# Patient Record
Sex: FEMALE | Race: WHITE | Employment: FULL TIME | URBAN - METROPOLITAN AREA
[De-identification: names, ages, dates, MRNs, and addresses within clinical notes are randomized per-mention and may not be internally consistent; named-entity substitution may affect disease eponyms.]

---

## 2017-05-17 LAB
CHLAMYDIA, EXTERNAL: NEGATIVE
HBSAG, EXTERNAL: NEGATIVE
HIV, EXTERNAL: NONREACTIVE
N. GONORRHEA, EXTERNAL: NEGATIVE
RUBELLA, EXTERNAL: NORMAL
TYPE, ABO & RH, EXTERNAL: NORMAL

## 2017-09-27 LAB
ANTIBODY SCREEN, EXTERNAL: NEGATIVE
T. PALLIDUM, EXTERNAL: NEGATIVE

## 2017-11-22 LAB — GRBS, EXTERNAL: NEGATIVE

## 2017-12-04 ENCOUNTER — HOSPITAL ENCOUNTER (INPATIENT)
Age: 32
LOS: 3 days | Discharge: HOME | End: 2017-12-07
Attending: OBSTETRICS & GYNECOLOGY | Admitting: OBSTETRICS & GYNECOLOGY
Payer: COMMERCIAL

## 2017-12-04 PROBLEM — O13.9 GESTATIONAL HYPERTENSION: Status: ACTIVE | Noted: 2017-12-04

## 2017-12-04 LAB
ALBUMIN SERPL-MCNC: 3 G/DL (ref 3.5–5)
ALBUMIN/GLOB SERPL: 0.7 {RATIO} (ref 1.1–2.2)
ALP SERPL-CCNC: 195 U/L (ref 45–117)
ALT SERPL-CCNC: 20 U/L (ref 12–78)
ANION GAP SERPL CALC-SCNC: 9 MMOL/L (ref 5–15)
AST SERPL-CCNC: 18 U/L (ref 15–37)
BILIRUB SERPL-MCNC: 0.3 MG/DL (ref 0.2–1)
BUN SERPL-MCNC: 8 MG/DL (ref 6–20)
BUN/CREAT SERPL: 12 (ref 12–20)
CALCIUM SERPL-MCNC: 8.5 MG/DL (ref 8.5–10.1)
CHLORIDE SERPL-SCNC: 107 MMOL/L (ref 97–108)
CO2 SERPL-SCNC: 21 MMOL/L (ref 21–32)
CREAT SERPL-MCNC: 0.66 MG/DL (ref 0.55–1.02)
CREAT UR-MCNC: 39.7 MG/DL
ERYTHROCYTE [DISTWIDTH] IN BLOOD BY AUTOMATED COUNT: 13.5 % (ref 11.5–14.5)
GLOBULIN SER CALC-MCNC: 4.4 G/DL (ref 2–4)
GLUCOSE SERPL-MCNC: 73 MG/DL (ref 65–100)
HCT VFR BLD AUTO: 37.6 % (ref 35–47)
HGB BLD-MCNC: 12.7 G/DL (ref 11.5–16)
MCH RBC QN AUTO: 30.2 PG (ref 26–34)
MCHC RBC AUTO-ENTMCNC: 33.8 G/DL (ref 30–36.5)
MCV RBC AUTO: 89.5 FL (ref 80–99)
PLATELET # BLD AUTO: 217 K/UL (ref 150–400)
POTASSIUM SERPL-SCNC: 3.6 MMOL/L (ref 3.5–5.1)
PROT SERPL-MCNC: 7.4 G/DL (ref 6.4–8.2)
PROT UR-MCNC: 7 MG/DL (ref 0–11.9)
PROT/CREAT UR-RTO: 0.2
RBC # BLD AUTO: 4.2 M/UL (ref 3.8–5.2)
SODIUM SERPL-SCNC: 137 MMOL/L (ref 136–145)
URATE SERPL-MCNC: 5.2 MG/DL (ref 2.6–6)
WBC # BLD AUTO: 11.3 K/UL (ref 3.6–11)

## 2017-12-04 PROCEDURE — 65410000002 HC RM PRIVATE OB

## 2017-12-04 PROCEDURE — 77030007880 HC KT SPN EPDRL BBMI -B

## 2017-12-04 PROCEDURE — 75410000002 HC LABOR FEE PER 1 HR

## 2017-12-04 PROCEDURE — 80053 COMPREHEN METABOLIC PANEL: CPT | Performed by: OBSTETRICS & GYNECOLOGY

## 2017-12-04 PROCEDURE — 85027 COMPLETE CBC AUTOMATED: CPT | Performed by: OBSTETRICS & GYNECOLOGY

## 2017-12-04 PROCEDURE — 77030034849

## 2017-12-04 PROCEDURE — 84156 ASSAY OF PROTEIN URINE: CPT | Performed by: OBSTETRICS & GYNECOLOGY

## 2017-12-04 PROCEDURE — 4A0HXCZ MEASUREMENT OF PRODUCTS OF CONCEPTION, CARDIAC RATE, EXTERNAL APPROACH: ICD-10-PCS | Performed by: OBSTETRICS & GYNECOLOGY

## 2017-12-04 PROCEDURE — 59025 FETAL NON-STRESS TEST: CPT

## 2017-12-04 PROCEDURE — 10907ZC DRAINAGE OF AMNIOTIC FLUID, THERAPEUTIC FROM PRODUCTS OF CONCEPTION, VIA NATURAL OR ARTIFICIAL OPENING: ICD-10-PCS | Performed by: OBSTETRICS & GYNECOLOGY

## 2017-12-04 PROCEDURE — 84550 ASSAY OF BLOOD/URIC ACID: CPT | Performed by: OBSTETRICS & GYNECOLOGY

## 2017-12-04 PROCEDURE — 36415 COLL VENOUS BLD VENIPUNCTURE: CPT | Performed by: OBSTETRICS & GYNECOLOGY

## 2017-12-04 RX ORDER — SODIUM CHLORIDE 0.9 % (FLUSH) 0.9 %
5-10 SYRINGE (ML) INJECTION EVERY 8 HOURS
Status: DISCONTINUED | OUTPATIENT
Start: 2017-12-04 | End: 2017-12-05

## 2017-12-04 RX ORDER — SODIUM CHLORIDE, SODIUM LACTATE, POTASSIUM CHLORIDE, CALCIUM CHLORIDE 600; 310; 30; 20 MG/100ML; MG/100ML; MG/100ML; MG/100ML
125 INJECTION, SOLUTION INTRAVENOUS CONTINUOUS
Status: DISCONTINUED | OUTPATIENT
Start: 2017-12-04 | End: 2017-12-04

## 2017-12-04 RX ORDER — SODIUM CHLORIDE 0.9 % (FLUSH) 0.9 %
5-10 SYRINGE (ML) INJECTION AS NEEDED
Status: DISCONTINUED | OUTPATIENT
Start: 2017-12-04 | End: 2017-12-05

## 2017-12-04 RX ORDER — OXYTOCIN IN 5 % DEXTROSE 30/500 ML
1-25 PLASTIC BAG, INJECTION (ML) INTRAVENOUS
Status: DISCONTINUED | OUTPATIENT
Start: 2017-12-05 | End: 2017-12-05

## 2017-12-04 RX ORDER — SODIUM CHLORIDE, SODIUM LACTATE, POTASSIUM CHLORIDE, CALCIUM CHLORIDE 600; 310; 30; 20 MG/100ML; MG/100ML; MG/100ML; MG/100ML
125 INJECTION, SOLUTION INTRAVENOUS CONTINUOUS
Status: DISCONTINUED | OUTPATIENT
Start: 2017-12-05 | End: 2017-12-05

## 2017-12-04 RX ORDER — NALOXONE HYDROCHLORIDE 0.4 MG/ML
0.4 INJECTION, SOLUTION INTRAMUSCULAR; INTRAVENOUS; SUBCUTANEOUS AS NEEDED
Status: DISCONTINUED | OUTPATIENT
Start: 2017-12-04 | End: 2017-12-05

## 2017-12-04 NOTE — IP AVS SNAPSHOT
Summary of Care Report The Summary of Care report has been created to help improve care coordination. Users with access to [facility] or [address] (Web-based application) may access additional patient information including the Discharge Summary. If you are not currently a [address] user and need more information, please call the number listed below in the [place] [address] section and ask to be connected with Medical Records. Facility Information Name Address Phone [address] [address] [phone] Patient Information Patient Name Sex  Ne Miner ([id number]) Female 1985 Discharge Information Admitting Provider Service Area Unit Agustín Mcarthur MD / [address] [facility] [address]  Delivery / [phone] Discharge Provider Discharge Date/Time Discharge Disposition Destination (none) 2017 (Pending) AHR (none) Patient Language Language ENGLISH [13] Hospital Problems as of 2017  Reviewed: 2016  1:59 PM by Gisella Vázquez None Non-Hospital Problems as of 2017  Reviewed: 2016  1:59 PM by Gisella Vázquez Class Noted - Resolved Last Modified Active Problems Anxiety state, unspecified  3/2/2015 - Present 3/2/2015 by Rajani Contreras MD  
  Entered by Rajani Contreras MD  
  
You are allergic to the following Allergen Reactions Pcn (Penicillins) Hives Swelling Tramadol Nausea Only Vertigo Current Discharge Medication List  
  
START taking these medications Dose & Instructions Dispensing Information Comments  
 ibuprofen 600 mg tablet Commonly known as:  MOTRIN Dose:  600 mg Take 1 Tab by mouth every six (6) hours as needed for Pain for up to 360 days. Quantity:  30 Tab Refills:  0 CONTINUE these medications which have NOT CHANGED Dose & Instructions Dispensing Information Comments  
 prenatal multivit-ca-min-fe-fa Tab Take  by mouth. Refills:  0 Current Immunizations Name Date MMR 12/7/2017 Surgery Information ID Date/Time Status Primary Surgeon All Procedures Location [id number] 12/5/2017 Complete   ZZANESTHESIA [facility] - DO NOT SCHEDULE Follow-up Information Follow up With Details Comments Contact Info Henri Parsons NP   [address] [address] [phone] Discharge Instructions POST DELIVERY DISCHARGE INSTRUCTIONS Name: Radha Lr YOB: 1985 Primary Diagnosis: Active Problems: * No active hospital problems. * General:  
 
Diet/Diet Restrictions: 
· Eight 8-ounce glasses of fluid daily (water, juices); avoid excessive caffeine intake. · Meals/snacks as desired which are high in fiber and carbohydrates and low in fat and cholesterol. Medications:  
 
 
 
Physical Activity / Restrictions / Safety: · Avoid heavy lifting, no more that 8 lbs. For 2-3 weeks;  
· Limit use of stairs to 2 times daily for the first week home. · No driving for one week. · Avoid intercourse 4-6 weeks, no douching or tampon use. · Check with obstetrician before starting or resuming an exercise program.   
 
Discharge Instructions/Special Treatment/Home Care Needs:  
 
· Continue prenatal vitamins. · Continue to use squirt bottle with warm water on your episiotomy after each bathroom use until bleeding stops. · If steri-strips applied to your incision, remove in 7-10 days. Call your doctor for the following: · Fever over 101 degrees by mouth. · Vaginal bleeding heavier than a normal menstrual period or clots larger than a golf ball.  
· Red streaks or increased swelling of legs, painful red streaks on your breast. 
 · Painful urination, constipation and increased pain or swelling or discharge with your incision. · If you feel extremely anxious or overwhelmed. · If you have thoughts of harming yourself and/or your baby. Pain Management:  
 
· Take Acetaminophen (Tylenol) or Ibuprofen (Advil, Motrin), as directed for pain. · Use a warm Sitz bath 3 times daily to relieve episiotomy or hemorrhoidal discomfort. · For hemorrhoidal discomfort, use Tucks and Anusol cream as needed and directed. · Heating pad to  incision as needed. Follow-Up Care:  
 
Appointment with MD: Follow-up Appointments Procedures  FOLLOW UP VISIT Appointment in: One Week For blood pressure check with Dr Peggy Langley and 6 weeks for postpartum check For blood pressure check with Dr Peggy Langley and 6 weeks for postpartum check Standing Status:   Standing Number of Occurrences:   1 Order Specific Question:   Appointment in Answer: One Week Telephone number: [phone] Signed By: Tara Call MD                                                                                                   Date: 2017 Time: 10:26 AM 
 
Gamelet Activation Thank you for requesting access to Gamelet. Please follow the instructions below to securely access and download your online medical record. Gamelet allows you to send messages to your doctor, view your test results, renew your prescriptions, schedule appointments, and more. How Do I Sign Up? 1. In your internet browser, go to [email or web address]. [employer]. 2. Click on the First Time User? Click Here link in the Sign In box. You will see the New Member Sign Up page. 3. Enter your Gamelet Access Code exactly as it appears below. You will not need to use this code after youve completed the sign-up process. If you do not sign up before the expiration date, you must request a new code. Gamelet Access Code: [email or web address] Expires: 3/7/2018 11:07 AM (This is the date your Jiahe access code will ) 4. Enter the last four digits of your Social Security Number (xxxx) and Date of Birth (mm/dd/yyyy) as indicated and click Submit. You will be taken to the next sign-up page. 5. Create a Pyroliat ID. This will be your Jiahe login ID and cannot be changed, so think of one that is secure and easy to remember. 6. Create a Jiahe password. You can change your password at any time. 7. Enter your Password Reset Question and Answer. This can be used at a later time if you forget your password. 8. Enter your e-mail address. You will receive e-mail notification when new information is available in [address] 9. Click Sign Up. You can now view and download portions of your medical record. 10. Click the Download Summary menu link to download a portable copy of your medical information. Additional Information If you have questions, please visit the Frequently Asked Questions section of the Jiahe website at [email or web address] Remember, Jiahe is NOT to be used for urgent needs. For medical emergencies, dial 911. Chart Review Routing History Recipient Method Report Sent By Eric Alfaro NP Fax: [phone] Phone: [phone] Fax Efe Gutierrez MD NOTES AUTO ROUTING REPORT Arpan Elizondo MD [[id number]] 2017  1:54 PM 2017 Neena Alfaro NP Fax: [phone] Phone: [phone] Fax Efe Gutierrez MD NOTES AUTO ROUTING REPORT Marcio Hillman MD [[id number]] 2017 10:27 AM 2017

## 2017-12-04 NOTE — H&P
EDC:2017  EGA: 37 weeks, 6 days      Vital Signs   36Years Old Female  Height:  65.5 inches  Weight: 198 pounds  BMI:      32.57  BP:       144/98  [facility]    Pap/HPV/Gardasil History   History of abnormal pap: no  Gardasil Injection History: Declined    Patient's Prenatal Care with Doctor of Record Rachel Girard MD Notable For -    Gestational hypertension  Rubella non immune vaccinate PP   lab screening  Normal pregnancy primigravida                  Allergies    PENICILLIN (Moderate)  TRAMADOL HCL (TRAMADOL HCL TABS) (Moderate)      Medications Removed from Medication List        Flowsheet View for Follow-up Visit     Estimated weeks of        gestation:  37 6/7     Weight:  198     Blood pressure: 144 / 98     Urine Protein:  N     Urine Glucose: N     Headache:  No     Nausea/vomiting: No     Edema:  2+LE     Vaginal bleeding: no     Vaginal discharge: no     Fetal activity:  yes     Fundal height:    37     FHR:   140     Labor symptoms: few ctx     Fetal position:  cephalic     Cx Dilation:  1     Cx Effacement: 60%     Cx Station:  high     Next visit:  1 wk     Preceptor:  Tristan Davis:  denies exposure     Comment:  javy ok . home bps to 150 yesterday . repeat blood pressure 140s/ high 90s   to L&D for further evaluation           Impression & Recommendations:    Problem # 1:  Gestational hypertension (IAZ-930.34) (OEW43-B48.3)  to L&D for further evaluation of accelerated GHTN v preEclampsia  The patient is counseled and her questions are answered. Orders:  Fetal non-stress test ([id number])  76815-Limited ([id number])  As Scheduled (xxxx)          Other Orders:  Antepartum Care (CPT-10)      Medications (at conclusion of this visit)    2017 PRENATAL VITAMINS 28-0.8 MG ORAL TABLET (PRENATAL VIT-FE FUMARATE-FA) Prescribed by non 606/706 Julianna Moreira MD          Primary Provider:  Rachel Girard MD    CC:  Elevated Blood Pressure.     History of Present Illness: The patient presents today for obstetric evaluation. The patient complains of elevated blood pressure to 144/98 in office today , but denies abdominal pain, visual changes or labor. Barclay Muhammad  contractions are mild and irregular. The status of membranes is intact. Vaginal bleeding is not present. Fetal movement is present. She has been monitored [facility] outpatient GHTN for the last couple of weeks. Last growth Ultrasound 11/17    51th  %ile 6 #        Past Medical History:     Reviewed history from 05/17/2017 and no changes required:        mirena 11/15  dc'd 9/16    Past Surgical History:     Reviewed history from 05/17/2017 and no changes required:        Negative    Family History Summary:      Reviewed history Last on 11/22/2017 and no changes required:12/04/2017  MGM - Has Family History of Lung Cancer - Entered On: 11/20/2015  Father Mimi Payment.) - Has Family History of Diabetes - Entered On: 11/20/2015  Father Mimi Payment.) - Has Family History of Heart Disease - Entered On: 11/20/2015  Father Mimi Payment.) - Has Family History of Stroke/CVA - Entered On: 11/20/2015      Social History:     Reviewed history from 05/17/2017 and no changes required:        -  [place] (2017) VSP.   his sister is Brittany Money        works for [employer] as a       Risk Factors:     Smoked Tobacco Use:  Never smoker  Alcohol use:  no  Exercise:  no        Vital Signs    Blood Pressure: 144 / 98    Height:   65.5 inches  Weight:  198 pounds    BMI:   32.57 inches    Physical Exam     General           General appearance:  no acute distress    Head           Inspection:   normal    Eyes           External:   EOM intact    ENT           Dental:   adequate dentition    Chest           Lungs:  clear to auscultation          Heart:  regular rate and rhythm    Extremeties           Extremeties:  2+ edema bilaterally    Neurological           Reflexes:  bilateral hyperreflexia, no clonus    Psych           Orientation:  oriented to time, place, and person          Mood:  no appearance of anxiety, depression, or agitation    Lymph           Inguinal:  no inguinal adenopathy    Skin           Inspection:  no rashes, suspicious lesions, or ulcerations    Abdomen           Abdomen:  gravid          Fundal Height:  37          EFW:  6.5#    Pelvic Exam           EGBUS:  no lesions          Vagina:  normal appearing without lesions or discharge          Uterus:  gravid          Cervix:  no lesions or discharge                  Dilation: : 1                  Effacement:  60%                  Station:  high                  Presentation:  cephalic                  Membranes:  intact                  Pelvis:  adequate                  Consistency:  soft                  Position: anterior  [place] Score:  7    Allergies    PENICILLIN (Moderate)  TRAMADOL HCL (TRAMADOL HCL TABS) (Moderate)      Medications Removed from Medication List        Impression & Recommendations:    Problem # 1:  Gestational hypertension (BLT-109.52) (VOG99-Y54.3)  to L&D for further evaluation of accelerated GHTN v preEclampsia  The patient is counseled and her questions are answered. Orders:  Fetal non-stress test ([id number])  76815-Limited ([id number])  As Scheduled (xxxx)          Other Orders:  Antepartum Care (CPT-10)      Medications (at conclusion of this visit)    05/17/2017 PRENATAL VITAMINS 28-0.8 MG ORAL TABLET (PRENATAL VIT-FE FUMARATE-FA) Prescribed by non 016/234 Julianna Moreira MD          LABORATORY DATA   TEST DATE RESULT   Group B Strep culture 11/22/2017 Negative                                   (Group B Strep Culture Result Field)   Blood Type 05/17/2017 O                                             (Blood Type Result Field)   Rh 05/17/2017 Positive                                   (Rh Result Field)   Rhogam Inj Given     Tdap Vaccine Given 09/27/2017 Vacc.  [address]   Antibody Screen 09/27/2017 Negative   Rubella  [facility] Reference Ranges On or After 3/10/14                  <0.90              Non-immune      0.90 - 0.99     Equivocal      >0.99              Immune    [facility] Reference Ranges  Before 3/10/14           <5                 Non-immune             5 - 9               Equivocal            >9                 Immune  [facility] Reference Ranges       < Or = 0.90       Negative             0.91-1.09          Equivocal            > Or = 1.10       Positive   05/17/2017     <0.90     TPA (T Pallidum Antibodies) 09/27/2017 Negative   Serology (RPR)     HBsAg 05/17/2017 Negative   HIV 05/17/2017 Non Reactive   Hemoglobin 11/27/2017 12.5   Hematocrit 11/27/2017 38.5   Platelets 55/23/5264 189 X10E3/UL   TSH     Urine Culture 08/21/2017 Negative   GC DNA Probe 05/17/2017 Negative   Chlamydia DNA 05/17/2017 Negative   PAP 11/20/2015 NIL   Flu Vaccine Given 09/27/2017 Vacc. VWC   HGBA1C     HGB Electro     T4, Free     BG Fasting     GTT 1H 50G 09/27/2017 114   GTT 1H 100G     GTT 2H 100G     GTT 3H 100G     Glucose Plasma     CF Accept or Decline     CF Screen Result 05/17/2017 Negative   Nuchal Trans 06/14/2017 1.6^1. 6 mm&millimeters   AFP Only 07/05/2017 *Screen Negative*   Tetra     AFP Serum     CVS 09/27/2017 accepted   AFP Amniotic     Amnio Karyo 05/17/2017 declined   FISH     GC Culture     Chlamydia Cult     Ureaplasma     Mycoplasma     WBC 11/27/2017 9.0 X10E3/UL   RBC 11/27/2017 4.10 X10E6/UL   MCV 11/27/2017 94   MCH 11/27/2017 30.5   MCHC RBC 11/27/2017 32.5     ULTRASOUND DATA   TEST DATE RESULT   Estimated Fetal Weight 11/27/2017 0521.16201075^8486 g&grams                                     Weight % 11/27/2017 52^52% %&percent                                                JOSSELYN 11/27/2017 00.41^23.5 cm&centimeters                    BPP     Cervical Length (mm)       ]      Electronically signed by Marsha Ojeda MD on 12/04/2017 at 10:09 AM    ________________________________________________________________________    Date of Surgery Update:  Americo White was seen and examined. S: No CTX, VB, LOF.  + FM. No HA, vision changes, RUQ pain. O:   Patient Vitals for the past 12 hrs:   Temp Pulse Resp BP SpO2   12/04/17 1326 - 93 - 135/88 93 %   12/04/17 1258 98 °F (36.7 °C) 85 18 (!) 141/94 95 %     Gen: NAD  Chest: normal wob  Abd: Gravid, NT/ND. EFW 6.5-7#. : last exam today by Dr. Louie Lesser 1/60. Will repeat during ripening. LE: no CT, 2+ edema    Labs pending including CBC, CMP, uric acid, and UP:C    A/P: 36 y.o. G1 at 37w6d with gHTN vs. Preeclampsia. Proceed with IOL.  -Discussed process of ripening, followed by pitocin/AROM. -Cat 1 tracing  -GBS negative  -Continue to monitor BPs. F/u labs. If si/sx of severe disease develop, will start magnesium.        Signed By: Felipa Pearce MD     December 4, 2017 1:47 PM

## 2017-12-04 NOTE — ROUTINE PROCESS
Pt in from office in [place]. Gr 1 Para 0 EDC 12/19/17 Allergy to PCN and Tramadol HCL. In to evaluate bp and labs. 1450 Pt moved to room 3163 for induction. Pt was given time to call work and make arrangements. Dr Yuni Garcia notified. [facility]  4410 Dr Yuni Garcia in [place] bulb placed with 60cc normal saline inserted in cervix 2 cms dilated. POC pt may eat in 1 hour and may be off the monitor. No IV at this time. 1639 Off monitor per Dr Yuni Garcia.    2017 Report to [facility]

## 2017-12-04 NOTE — PROGRESS NOTES
SVE 2/50/-2. FB placed. Plan--monitor 30 mins, if reactive can come off monitoring and can eat. Overnight, FHTs/vitals every 4 hours. No IVFs if adequate po intake. In am, start IVFs and pitocin. NPO with ice chips. Continuous monitoring.

## 2017-12-04 NOTE — IP AVS SNAPSHOT
[address] [facility] 
[phone] Patient: Isabel Qureshi MRN: [id number] :1985 About your hospitalization You were admitted on:  2017 You last received care in the:  [facility] 3 LABOR & DELIVERY You were discharged on:  2017 Why you were hospitalized Your primary diagnosis was:  Not on File Your diagnoses also included:  Gestational Hypertension Things You Need To Do (next 8 weeks) Follow up with Maury Engel NP Phone:  [phone] Where:  [address], [address] [facility] Discharge Orders None A check ilia indicates which time of day the medication should be taken. My Medications TAKE these medications as instructed Instructions Each Dose to Equal  
 Morning Noon Evening Bedtime  
 ibuprofen 600 mg tablet Commonly known as:  MOTRIN Your last dose was: Your next dose is: Take 1 Tab by mouth every six (6) hours as needed for Pain for up to 360 days. 600 mg  
    
   
   
   
  
 prenatal multivit-ca-min-fe-fa Tab Your last dose was: Your next dose is: Take  by mouth. Where to Get Your Medications Information on where to get these meds will be given to you by the nurse or doctor. ! Ask your nurse or doctor about these medications  
  ibuprofen 600 mg tablet Discharge Instructions POST DELIVERY DISCHARGE INSTRUCTIONS Name: Isabel Qureshi YOB: 1985 Primary Diagnosis: Active Problems: * No active hospital problems. * General:  
 
Diet/Diet Restrictions: 
· Eight 8-ounce glasses of fluid daily (water, juices); avoid excessive caffeine intake. · Meals/snacks as desired which are high in fiber and carbohydrates and low in fat and cholesterol. Medications: Physical Activity / Restrictions / Safety: · Avoid heavy lifting, no more that 8 lbs. For 2-3 weeks;  
· Limit use of stairs to 2 times daily for the first week home. · No driving for one week. · Avoid intercourse 4-6 weeks, no douching or tampon use. · Check with obstetrician before starting or resuming an exercise program.   
 
Discharge Instructions/Special Treatment/Home Care Needs:  
 
· Continue prenatal vitamins. · Continue to use squirt bottle with warm water on your episiotomy after each bathroom use until bleeding stops. · If steri-strips applied to your incision, remove in 7-10 days. Call your doctor for the following: · Fever over 101 degrees by mouth. · Vaginal bleeding heavier than a normal menstrual period or clots larger than a golf ball. · Red streaks or increased swelling of legs, painful red streaks on your breast. 
· Painful urination, constipation and increased pain or swelling or discharge with your incision. · If you feel extremely anxious or overwhelmed. · If you have thoughts of harming yourself and/or your baby. Pain Management:  
 
· Take Acetaminophen (Tylenol) or Ibuprofen (Advil, Motrin), as directed for pain. · Use a warm Sitz bath 3 times daily to relieve episiotomy or hemorrhoidal discomfort. · For hemorrhoidal discomfort, use Tucks and Anusol cream as needed and directed. · Heating pad to  incision as needed. Follow-Up Care:  
 
Appointment with MD: Follow-up Appointments Procedures  FOLLOW UP VISIT Appointment in: One Week For blood pressure check with Dr Roderick Doan and 6 weeks for postpartum check For blood pressure check with Dr Roderick Doan and 6 weeks for postpartum check Standing Status:   Standing Number of Occurrences:   1 Order Specific Question:   Appointment in Answer: One Week Telephone number: [phone] Signed By: Sandra Fulton MD Date: 2017 Time: 10:26 AM 
 
MyChart Activation Thank you for requesting access to rSmart. Please follow the instructions below to securely access and download your online medical record. rSmart allows you to send messages to your doctor, view your test results, renew your prescriptions, schedule appointments, and more. How Do I Sign Up? 1. In your internet browser, go to [email or web address]. 2. Click on the First Time User? Click Here link in the Sign In box. You will see the New Member Sign Up page. 3. Enter your rSmart Access Code exactly as it appears below. You will not need to use this code after youve completed the sign-up process. If you do not sign up before the expiration date, you must request a new code. rSmart Access Code: [email or web address] Expires: 3/7/2018 11:07 AM (This is the date your rSmart access code will ) 4. Enter the last four digits of your Social Security Number (xxxx) and Date of Birth (mm/dd/yyyy) as indicated and click Submit. You will be taken to the next sign-up page. 5. Create a rSmart ID. This will be your rSmart login ID and cannot be changed, so think of one that is secure and easy to remember. 6. Create a rSmart password. You can change your password at any time. 7. Enter your Password Reset Question and Answer. This can be used at a later time if you forget your password. 8. Enter your e-mail address. You will receive e-mail notification when new information is available in [address] 9. Click Sign Up. You can now view and download portions of your medical record. 10. Click the Download Summary menu link to download a portable copy of your medical information. Additional Information If you have questions, please visit the Frequently Asked Questions section of the rSmart website at [email or web address] Remember, [employer] is NOT to be used for urgent needs. For medical emergencies, dial 911. [employer] Announcement We are excited to announce that we are making your provider's discharge notes available to you in [employer]. You will see these notes when they are completed and signed by the physician that discharged you from your recent hospital stay. If you have any questions or concerns about any information you see in [employer], please call the Health Information Department where you were seen or reach out to your Primary Care Provider for more information about your plan of care. Introducing hospitals & HEALTH SERVICES! [employer] introduces [employer] patient portal. Now you can access parts of your medical record, email your doctor's office, and request medication refills online. 1. In your internet browser, go to [email or web address] 2. Click on the First Time User? Click Here link in the Sign In box. You will see the New Member Sign Up page. 3. Enter your [employer] Access Code exactly as it appears below. You will not need to use this code after youve completed the sign-up process. If you do not sign up before the expiration date, you must request a new code. · [employer] Access Code: [email or web address] Expires: 3/7/2018 11:07 AM 
 
4. Enter the last four digits of your Social Security Number (xxxx) and Date of Birth (mm/dd/yyyy) as indicated and click Submit. You will be taken to the next sign-up page. 5. Create a Rebelle Bridalt ID. This will be your [employer] login ID and cannot be changed, so think of one that is secure and easy to remember. 6. Create a [employer] password. You can change your password at any time. 7. Enter your Password Reset Question and Answer. This can be used at a later time if you forget your password. 8. Enter your e-mail address. You will receive e-mail notification when new information is available in [address] 9. Click Sign Up. You can now view and download portions of your medical record. 10. Click the Download Summary menu link to download a portable copy of your medical information. If you have questions, please visit the Frequently Asked Questions section of the [employer] website. Remember, [employer] is NOT to be used for urgent needs. For medical emergencies, dial 911. Now available from your iPhone and Android! Providers Seen During Your Hospitalization Provider Specialty Primary office phone Edu Epps MD Obstetrics & Gynecology [phone] Immunizations Administered for This Admission Name Date MMR 12/7/2017 Your Primary Care Physician (PCP) Primary Care Physician Office Phone Office Fax Bradno Oliveira [phone] You are allergic to the following Allergen Reactions Pcn (Penicillins) Hives Swelling Tramadol Nausea Only Vertigo Recent Documentation Height Weight Breastfeeding? BMI OB Status Smoking Status 1.626 m 89.2 kg Unknown 33.75 kg/m2 Recent pregnancy Never Smoker Emergency Contacts Name Discharge Info Relation Home Work Mobile Laci Tapia DISCHARGE CAREGIVER [3] Spouse [3] [phone] Aguilar Gardner  Father [15] [phone] Patient Belongings The following personal items are in your possession at time of discharge: 
  Dental Appliances: None  Visual Aid: Contacts, Glasses      Home Medications: None   Jewelry: None  Clothing: At bedside, Footwear, Shirt, Shorts, Undergarments Please provide this summary of care documentation to your next provider. Signatures-by signing, you are acknowledging that this After Visit Summary has been reviewed with you and you have received a copy. Patient Signature:  ____________________________________________________________ Date:  ____________________________________________________________  
  
[place] Favorite Provider Signature:  ____________________________________________________________ Date:  ____________________________________________________________

## 2017-12-05 ENCOUNTER — ANESTHESIA EVENT (OUTPATIENT)
Dept: LABOR AND DELIVERY | Age: 32
End: 2017-12-05
Payer: COMMERCIAL

## 2017-12-05 ENCOUNTER — ANESTHESIA (OUTPATIENT)
Dept: LABOR AND DELIVERY | Age: 32
End: 2017-12-05
Payer: COMMERCIAL

## 2017-12-05 PROCEDURE — 74011000250 HC RX REV CODE- 250

## 2017-12-05 PROCEDURE — 74011250636 HC RX REV CODE- 250/636: Performed by: OBSTETRICS & GYNECOLOGY

## 2017-12-05 PROCEDURE — 3E033VJ INTRODUCTION OF OTHER HORMONE INTO PERIPHERAL VEIN, PERCUTANEOUS APPROACH: ICD-10-PCS | Performed by: OBSTETRICS & GYNECOLOGY

## 2017-12-05 PROCEDURE — 3E0R3BZ INTRODUCTION OF ANESTHETIC AGENT INTO SPINAL CANAL, PERCUTANEOUS APPROACH: ICD-10-PCS | Performed by: ANESTHESIOLOGY

## 2017-12-05 PROCEDURE — 74011250636 HC RX REV CODE- 250/636: Performed by: ANESTHESIOLOGY

## 2017-12-05 PROCEDURE — 65410000002 HC RM PRIVATE OB

## 2017-12-05 PROCEDURE — 00HU33Z INSERTION OF INFUSION DEVICE INTO SPINAL CANAL, PERCUTANEOUS APPROACH: ICD-10-PCS | Performed by: ANESTHESIOLOGY

## 2017-12-05 PROCEDURE — 75410000000 HC DELIVERY VAGINAL/SINGLE

## 2017-12-05 PROCEDURE — 75410000003 HC RECOV DEL/VAG/CSECN EA 0.5 HR

## 2017-12-05 PROCEDURE — 77030003666 HC NDL SPINAL BD -A

## 2017-12-05 PROCEDURE — 0HQ9XZZ REPAIR PERINEUM SKIN, EXTERNAL APPROACH: ICD-10-PCS | Performed by: OBSTETRICS & GYNECOLOGY

## 2017-12-05 PROCEDURE — 74011250637 HC RX REV CODE- 250/637: Performed by: OBSTETRICS & GYNECOLOGY

## 2017-12-05 PROCEDURE — 74011250636 HC RX REV CODE- 250/636

## 2017-12-05 PROCEDURE — 77030002888 HC SUT CHRMC J&J -A

## 2017-12-05 PROCEDURE — 75410000002 HC LABOR FEE PER 1 HR

## 2017-12-05 PROCEDURE — 76060000078 HC EPIDURAL ANESTHESIA

## 2017-12-05 PROCEDURE — 77030014125 HC TY EPDRL BBMI -B: Performed by: ANESTHESIOLOGY

## 2017-12-05 RX ORDER — SODIUM CHLORIDE 0.9 % (FLUSH) 0.9 %
5-10 SYRINGE (ML) INJECTION AS NEEDED
Status: DISCONTINUED | OUTPATIENT
Start: 2017-12-05 | End: 2017-12-05

## 2017-12-05 RX ORDER — BUPIVACAINE HYDROCHLORIDE AND EPINEPHRINE 2.5; 5 MG/ML; UG/ML
INJECTION, SOLUTION EPIDURAL; INFILTRATION; INTRACAUDAL; PERINEURAL AS NEEDED
Status: DISCONTINUED | OUTPATIENT
Start: 2017-12-05 | End: 2017-12-05 | Stop reason: HOSPADM

## 2017-12-05 RX ORDER — NALOXONE HYDROCHLORIDE 0.4 MG/ML
0.4 INJECTION, SOLUTION INTRAMUSCULAR; INTRAVENOUS; SUBCUTANEOUS AS NEEDED
Status: DISCONTINUED | OUTPATIENT
Start: 2017-12-05 | End: 2017-12-07 | Stop reason: HOSPADM

## 2017-12-05 RX ORDER — IBUPROFEN 400 MG/1
800 TABLET ORAL EVERY 8 HOURS
Status: DISCONTINUED | OUTPATIENT
Start: 2017-12-05 | End: 2017-12-07 | Stop reason: HOSPADM

## 2017-12-05 RX ORDER — HYDROCORTISONE ACETATE PRAMOXINE HCL 2.5; 1 G/100G; G/100G
CREAM TOPICAL AS NEEDED
Status: DISCONTINUED | OUTPATIENT
Start: 2017-12-05 | End: 2017-12-07 | Stop reason: HOSPADM

## 2017-12-05 RX ORDER — FENTANYL/BUPIVACAINE/NS/PF 2-1250MCG
PREFILLED PUMP RESERVOIR EPIDURAL
Status: DISCONTINUED
Start: 2017-12-05 | End: 2017-12-05

## 2017-12-05 RX ORDER — ONDANSETRON 4 MG/1
4 TABLET, ORALLY DISINTEGRATING ORAL
Status: DISCONTINUED | OUTPATIENT
Start: 2017-12-05 | End: 2017-12-07 | Stop reason: HOSPADM

## 2017-12-05 RX ORDER — DOCUSATE SODIUM 100 MG/1
100 CAPSULE, LIQUID FILLED ORAL
Status: DISCONTINUED | OUTPATIENT
Start: 2017-12-05 | End: 2017-12-07 | Stop reason: HOSPADM

## 2017-12-05 RX ORDER — ACETAMINOPHEN 325 MG/1
650 TABLET ORAL
Status: DISCONTINUED | OUTPATIENT
Start: 2017-12-05 | End: 2017-12-07 | Stop reason: HOSPADM

## 2017-12-05 RX ORDER — OXYCODONE AND ACETAMINOPHEN 5; 325 MG/1; MG/1
1 TABLET ORAL
Status: DISCONTINUED | OUTPATIENT
Start: 2017-12-05 | End: 2017-12-07 | Stop reason: HOSPADM

## 2017-12-05 RX ORDER — FENTANYL/BUPIVACAINE/NS/PF 2-1250MCG
1-16 PREFILLED PUMP RESERVOIR EPIDURAL CONTINUOUS
Status: DISCONTINUED | OUTPATIENT
Start: 2017-12-05 | End: 2017-12-05

## 2017-12-05 RX ORDER — SODIUM CHLORIDE 0.9 % (FLUSH) 0.9 %
5-10 SYRINGE (ML) INJECTION EVERY 8 HOURS
Status: DISCONTINUED | OUTPATIENT
Start: 2017-12-05 | End: 2017-12-05

## 2017-12-05 RX ORDER — FENTANYL CITRATE 50 UG/ML
INJECTION, SOLUTION INTRAMUSCULAR; INTRAVENOUS
Status: DISCONTINUED
Start: 2017-12-05 | End: 2017-12-05

## 2017-12-05 RX ORDER — BUPIVACAINE HYDROCHLORIDE AND EPINEPHRINE 2.5; 5 MG/ML; UG/ML
INJECTION, SOLUTION EPIDURAL; INFILTRATION; INTRACAUDAL; PERINEURAL
Status: DISCONTINUED
Start: 2017-12-05 | End: 2017-12-05

## 2017-12-05 RX ORDER — OXYTOCIN/RINGER'S LACTATE 20/1000 ML
125-500 PLASTIC BAG, INJECTION (ML) INTRAVENOUS ONCE
Status: ACTIVE | OUTPATIENT
Start: 2017-12-05 | End: 2017-12-06

## 2017-12-05 RX ADMIN — SODIUM CHLORIDE, SODIUM LACTATE, POTASSIUM CHLORIDE, AND CALCIUM CHLORIDE 125 ML/HR: 600; 310; 30; 20 INJECTION, SOLUTION INTRAVENOUS at 06:42

## 2017-12-05 RX ADMIN — BUPIVACAINE HYDROCHLORIDE AND EPINEPHRINE 3 ML: 2.5; 5 INJECTION, SOLUTION EPIDURAL; INFILTRATION; INTRACAUDAL; PERINEURAL at 15:49

## 2017-12-05 RX ADMIN — Medication 2 MILLI-UNITS/MIN: at 06:43

## 2017-12-05 RX ADMIN — IBUPROFEN 800 MG: 400 TABLET, FILM COATED ORAL at 23:15

## 2017-12-05 RX ADMIN — BUPIVACAINE HYDROCHLORIDE AND EPINEPHRINE 5 ML: 2.5; 5 INJECTION, SOLUTION EPIDURAL; INFILTRATION; INTRACAUDAL; PERINEURAL at 15:50

## 2017-12-05 RX ADMIN — FENTANYL 0.2 MG/100ML-BUPIV 0.125%-NACL 0.9% EPIDURAL INJ 12 ML/HR: 2/0.125 SOLUTION at 15:55

## 2017-12-05 RX ADMIN — SODIUM CHLORIDE, SODIUM LACTATE, POTASSIUM CHLORIDE, AND CALCIUM CHLORIDE 125 ML/HR: 600; 310; 30; 20 INJECTION, SOLUTION INTRAVENOUS at 15:57

## 2017-12-05 RX ADMIN — SODIUM CHLORIDE, SODIUM LACTATE, POTASSIUM CHLORIDE, AND CALCIUM CHLORIDE 125 ML/HR: 600; 310; 30; 20 INJECTION, SOLUTION INTRAVENOUS at 14:40

## 2017-12-05 RX ADMIN — BUPIVACAINE HYDROCHLORIDE AND EPINEPHRINE 5 ML: 2.5; 5 INJECTION, SOLUTION EPIDURAL; INFILTRATION; INTRACAUDAL; PERINEURAL at 15:52

## 2017-12-05 NOTE — PROGRESS NOTES
In to see patient Admit overnight for cervical ripening now s/p holbrook bulb Pitocin now at 14. Ctx every 2-4 minutes. Palpate mild to moderate.    Up and out of bed, ambulating   Active FM  SVE  70/4/-1   Tense bag, intact   Will continue titrate pitocin for induction  BP remains stable, no meds   Normal labs on admit no evidence of pre-eclampsia    Epidural  PRN  Plan amniotomy post epidural, if needed for augmentation

## 2017-12-05 NOTE — PROGRESS NOTES
0730 rpeort from s crews, l willy now assume care of pt.    0750 pt standing at bedside    0810 pt sitting on peanut ball at beside.      0900 pt would like to continue to sit on peanut ball    0950 pt standing next to bed    1000 pt to br    1026 dr Mora Duran at bedside sve    1035 pt returned to sitting on peanut ball    1130 pt would like to continue to sit on peanut ball    1135 justin greg relieving l willy for lunch relief    1205 l willy now  assuming care of pt.     1230 pt sitting in bed    1255 report to taylor fallon rn

## 2017-12-05 NOTE — ANESTHESIA PROCEDURE NOTES
Epidural Block    Performed by: Joey Trevino  Authorized by: Joey Trevino     Pre-Procedure  Indication: labor epidural    Preanesthetic Checklist: patient identified, risks and benefits discussed, anesthesia consent, site marked, patient being monitored, timeout performed and anesthesia consent      Epidural:   Patient position:  Seated  Prep region:  Lumbar  Prep: DuraPrep    Location:  L3-4    Needle and Epidural Catheter:   Needle Type:  Tuohy  Needle Gauge:  17 G  Injection Technique:  Loss of resistance using air  Attempts:  1  Catheter Size:  19 G  Catheter at Skin Depth (cm):  12  Events: no blood with aspiration, no cerebrospinal fluid with aspiration, no paresthesia and negative aspiration test    Test Dose:  Bupivacaine 0.25% w/ epi and negative    Assessment:   Catheter Secured:  Tape and tegaderm  Insertion:  Uncomplicated  Patient tolerance:  Patient tolerated the procedure well with no immediate complications

## 2017-12-05 NOTE — PROGRESS NOTES
LE-Pt had an uneventful night, appeared to sleep well. 35021-Pvmwi bulb removed easily, 60cc. Pt up to shower. 0715-Bedside and Verbal shift change report given to ROCKY Wynn RN (oncoming nurse) by Kateryna Jain. Jaya Lopez RN (offgoing nurse). Report included the following information SBAR, MAR and Recent Results.

## 2017-12-05 NOTE — ANESTHESIA PREPROCEDURE EVALUATION
Anesthetic History   No history of anesthetic complications            Review of Systems / Medical History  Patient summary reviewed, nursing notes reviewed and pertinent labs reviewed    Pulmonary  Within defined limits                 Neuro/Psych   Within defined limits           Cardiovascular    Hypertension              Exercise tolerance: >4 METS     GI/Hepatic/Renal  Within defined limits              Endo/Other  Within defined limits           Other Findings              Physical Exam    Airway  Mallampati: II  TM Distance: 4 - 6 cm  Neck ROM: normal range of motion   Mouth opening: Normal     Cardiovascular  Regular rate and rhythm,  S1 and S2 normal,  no murmur, click, rub, or gallop             Dental  No notable dental hx       Pulmonary  Breath sounds clear to auscultation               Abdominal  GI exam deferred       Other Findings            Anesthetic Plan    ASA: 2  Anesthesia type: epidural            Anesthetic plan and risks discussed with: Patient and Family

## 2017-12-05 NOTE — PROGRESS NOTES
1250 - Verbal bedside shift report from KARRIE Wynn RN. Assumed care of pt at this time. 1300 - pt sitting in chair. 1403 - pt sitting on peanut ball beside bed. 1440 - Dr. Aldridge Persons at bedside. Cervical exam and AROM.     26 - Dr. Eleuterio Zhao notified of pt's desire for epidural.      1542 - pt sitting for epidural.  Time out    1908 - cervical exam by Dr. Trevino Bending -10/100/+2

## 2017-12-06 PROCEDURE — 65410000002 HC RM PRIVATE OB

## 2017-12-06 PROCEDURE — 74011250637 HC RX REV CODE- 250/637: Performed by: OBSTETRICS & GYNECOLOGY

## 2017-12-06 RX ADMIN — IBUPROFEN 800 MG: 400 TABLET, FILM COATED ORAL at 07:11

## 2017-12-06 RX ADMIN — IBUPROFEN 800 MG: 400 TABLET, FILM COATED ORAL at 14:51

## 2017-12-06 RX ADMIN — IBUPROFEN 800 MG: 400 TABLET, FILM COATED ORAL at 23:03

## 2017-12-06 NOTE — ROUTINE PROCESS
Bedside shift change report given to SEDRICK Barajas (oncoming nurse) by ROCKY Barrientos RN (offgoing nurse). Report included the following information SBAR, Intake/Output and MAR.

## 2017-12-06 NOTE — PROGRESS NOTES
In to see patient, comfortable with epidural  Some pressure with contractions   Pitocin 20, regular contractions   Category 1 fetal tracing   Continues leak clear fluid     SVE  C/C/+2   Vertex OA.  Good descent with practice push   Will set table for delivery and beginning pushing

## 2017-12-06 NOTE — ROUTINE PROCESS
TRANSFER - OUT REPORT:    Verbal report given to KARRIE Rosen RN(name) on [employer]  being transferred to MIU(unit) for routine progression of care       Report consisted of patients Situation, Background, Assessment and   Recommendations(SBAR). Information from the following report(s) SBAR, Kardex, Procedure Summary, Intake/Output, MAR, Accordion, Recent Results and Med Rec Status was reviewed with the receiving nurse. Lines:   Peripheral IV 12/04/17 Right Hand (Active)   Site Assessment Clean, dry, & intact 12/5/2017  8:00 PM   Phlebitis Assessment 0 12/5/2017  8:00 PM   Infiltration Assessment 0 12/5/2017  8:00 PM   Dressing Status Clean, dry, & intact 12/5/2017  8:00 PM   Dressing Type Tape;Transparent 12/5/2017  8:00 PM   Hub Color/Line Status Green; Infusing;Patent 12/5/2017  8:00 PM        Opportunity for questions and clarification was provided.       Patient transported with:   Registered Nurse

## 2017-12-06 NOTE — PROGRESS NOTES
191-Bedside report from [address] [place] [address], care assumed at this time    192-Patient pushing in semi-fowlers    -Dr. Richard Maher at bedside for delivery    -Bed broken down at this time    - of life infant female, see delivery summary    1930-Placenta delivered

## 2017-12-06 NOTE — L&D DELIVERY NOTE
Delivery Summary  Patient: Ton Rivera             Circumcision:   NA-female  Additional Delivery Comments - IOL secondary to GHTN. Cedillo followed by pitocin   AROM for augmentation. Uncomplicated   Vigorous infant at birth  1st degree perineal laceration Repaired anatomically. Hemostatic. \"[place] Jen\"          Information for the patient's : Carmine Thompson [[id number]]       Labor Events:    Labor: No   Rupture Date: 2017   Rupture Time: 2:45 PM   Rupture Type AROM   Amniotic Fluid Volume: Large    Amniotic Fluid Description: Clear None   Induction: Cedillo Bulb (balloon);AROM; Oxytocin       Augmentation: None   Labor Events: None     Cervical Ripenin2017 3:48 PM Cedillo/EASI     Delivery Events:  Episiotomy: None   Laceration(s): First degree perineal     Repaired: Yes    Number of Repair Packets: 1   Suture Type and Size: Chromic 2-0     Estimated Blood Loss (ml): 150ml       Delivery Date: 2017    Delivery Time: 7:26 PM  Delivery Type: Vaginal, Spontaneous Delivery  Sex:  Female     Gestational Age: 42w0d   Delivery Clinician:  Yisel Armenta  Living Status: Living   Delivery Location: L&D            APGARS  One minute Five minutes Ten minutes   Skin color: 1   1        Heart rate: 2   2        Grimace: 2   2        Muscle tone: 2   2        Breathin   2        Totals: 9   9            Presentation: Vertex    Position:   Occiput Anterior  Resuscitation Method:  Suctioning-bulb; Tactile Stimulation     Meconium Stained: None      Cord Vessels: 3 Vessels      Cord Events:    Cord Blood Sent?:  No    Blood Gases Sent?:  No    Placenta:  Date/Time:   7:30 PM  Removal: Spontaneous      Appearance: Normal     [place] Measurements:  Birth Weight: 3.085 kg      Birth Length: 51.4 cm      Head Circumference: 33.5 cm      Chest Circumference: 32 cm     Abdominal Girth: 33.5 cm    Other Providers:   MARICRUZ ARMENTA;FLAKITA CARRASQUILLO;ANYA WHITE;ARVIND Miguel Major, Obstetrician;Primary Nurse;Primary  Nurse;Staff Nurse           Cord pH:  none    Episiotomy: None   Laceration(s): First degree perineal     Estimated Blood Loss (ml): 150    Labor Events  Method: Ureña Bulb (balloon);AROM; Oxytocin      Augmentation: None   Cervical Ripenin2017  3:48 PM  [facility] Problems  Date Reviewed: 2016          Codes Class Noted POA    Gestational hypertension ICD-10-CM: O13.9  ICD-9-CM: 642.30  2017 Unknown              Operative Vaginal Delivery - none    Group B Strep:   Lab Results   Component Value Date/Time    GrBStrep, External negative 2017     Information for the patient's :   Makayla Hernandez [[id number]]   No results found for: ABORH, PCTABR, PCTDIG, BILI, ABORHEXT, ABORH    No results found for: APH, APCO2, APO2, AHCO3, ABEC, ABDC, O2ST, EPHV, PCO2V, PO2V, HCO3V, EBEV, EBDV, SITE, RSCOM

## 2017-12-06 NOTE — PROGRESS NOTES
Post-Partum Day Number 1 Progress Note    Radha Z Willie     Assessment: Doing well, post partum day 1    Plan:  1. Continue routine postpartum and perineal care as well as maternal education. 2. N/A     Information for the patient's : Tammi Cerna [[id number]]   Vaginal, Spontaneous Delivery   Patient doing well without significant complaint. Voiding without difficulty, normal lochia. Vitals:  Visit Vitals    /82 (BP 1 Location: Right arm, BP Patient Position: At rest)    Pulse 75    Temp 98.1 °F (36.7 °C)    Resp 18    Ht 5' 4\" (1.626 m)    Wt 89.2 kg (196 lb 9.6 oz)    SpO2 98%    Breastfeeding Unknown    BMI 33.75 kg/m2     Temp (24hrs), Av.9 °F (36.6 °C), Min:97.4 °F (36.3 °C), Max:98.3 °F (36.8 °C)        Exam:   Patient without distress. Abdomen soft, fundus firm, nontender                Perineum with normal lochia noted. Lower extremities are negative for swelling, cords or tenderness. Labs:     Lab Results   Component Value Date/Time    WBC 11.3 2017 01:14 PM    HGB 12.7 2017 01:14 PM    HCT 37.6 2017 01:14 PM    PLATELET 390  01:14 PM       No results found for this or any previous visit (from the past 24 hour(s)).

## 2017-12-07 VITALS
SYSTOLIC BLOOD PRESSURE: 121 MMHG | TEMPERATURE: 98 F | HEIGHT: 64 IN | RESPIRATION RATE: 16 BRPM | OXYGEN SATURATION: 98 % | BODY MASS INDEX: 33.57 KG/M2 | DIASTOLIC BLOOD PRESSURE: 80 MMHG | WEIGHT: 196.6 LBS | HEART RATE: 81 BPM

## 2017-12-07 PROBLEM — O13.9 GESTATIONAL HYPERTENSION: Status: RESOLVED | Noted: 2017-12-04 | Resolved: 2017-12-07

## 2017-12-07 PROCEDURE — 74011250636 HC RX REV CODE- 250/636: Performed by: OBSTETRICS & GYNECOLOGY

## 2017-12-07 PROCEDURE — 90707 MMR VACCINE SC: CPT | Performed by: OBSTETRICS & GYNECOLOGY

## 2017-12-07 RX ORDER — IBUPROFEN 600 MG/1
600 TABLET ORAL
Qty: 30 TAB | Refills: 0 | Status: SHIPPED | OUTPATIENT
Start: 2017-12-07 | End: 2018-12-02

## 2017-12-07 RX ADMIN — MEASLES, MUMPS, AND RUBELLA VIRUS VACCINE LIVE 0.5 ML: 1000; 12500; 1000 INJECTION, POWDER, LYOPHILIZED, FOR SUSPENSION SUBCUTANEOUS at 11:15

## 2017-12-07 NOTE — LACTATION NOTE
This note was copied from a baby's chart.     Couplet Interdisciplinary Rounds     MATERNAL    Daily Goal:     Influenza screening completed: YES   Tdap screening completed: YES   Rhogam Given:N/A  MMR Given:YES    VTE Prophylaxis: Not indicated, per Provider order    EPDS:            Patient Name: Fe Fofana Diagnosis: [place]  Single liveborn, born in hospital, delivered by vaginal delivery   Date of Admission: 2017 LOS: 2  Gestational Age: Gestational Age: 38w0d       Daily Goal:     Birth Weight: 3.085 kg Current Weight: Weight: 2.86 kg (6lbs 4.9oz)  % of Weight Change: -7%    Feeding:   Metabolic Screen: YES    Hepatitis B:  YES    Discharge Bili:  YES  Car Seat Trial, if needed:  N/A      Patient/Family Teaching Needs:     Days before discharge: Ready for discharge    In Attendance:  Nursing and Physician

## 2017-12-07 NOTE — ROUTINE PROCESS
Bedside shift change report given to LORE Russell RN (oncoming nurse) by ROCKY Li RN (offgoing nurse). Report included the following information SBAR, Procedure Summary, Intake/Output, MAR and Recent Results.

## 2017-12-07 NOTE — PROGRESS NOTES
Post-Partum Day Number 2 Progress Note    Radha Tapia     Assessment: Doing well, post partum day 2    Plan:   1. Discharge home today  2. Follow up in office in 6 weeks with Osiel Davis MD  3. Post partum activity advised, diet as tolerated  4. Discharge Medications: ibuprofen and medications prior to admission  5. GHTN with normal BPs postpartum with no medication- will need BP check in office next week    Information for the patient's : Tammi Cerna [[id number]]   Vaginal, Spontaneous Delivery   Patient doing well without significant complaint. Voiding without difficulty, normal lochia. Vitals:  Visit Vitals    /80 (BP 1 Location: Right arm, BP Patient Position: Sitting)    Pulse 81    Temp 98 °F (36.7 °C)    Resp 16    Ht 5' 4\" (1.626 m)    Wt 89.2 kg (196 lb 9.6 oz)    SpO2 98%    Breastfeeding Unknown    BMI 33.75 kg/m2     Temp (24hrs), Av.8 °F (36.6 °C), Min:97.6 °F (36.4 °C), Max:98 °F (36.7 °C)      Exam:         Patient without distress. Abdomen soft, fundus firm, nontender                 Lower extremities are negative for swelling, cords or tenderness. Labs:     Lab Results   Component Value Date/Time    WBC 11.3 2017 01:14 PM    HGB 12.7 2017 01:14 PM    HCT 37.6 2017 01:14 PM    PLATELET 209  01:14 PM       No results found for this or any previous visit (from the past 24 hour(s)).

## 2017-12-07 NOTE — DISCHARGE INSTRUCTIONS
POST DELIVERY DISCHARGE INSTRUCTIONS    Name: Moon Sampson  YOB: 1985  Primary Diagnosis: Active Problems:    * No active hospital problems. *      General:     Diet/Diet Restrictions:  · Eight 8-ounce glasses of fluid daily (water, juices); avoid excessive caffeine intake. · Meals/snacks as desired which are high in fiber and carbohydrates and low in fat and cholesterol. Medications:         Physical Activity / Restrictions / Safety:     · Avoid heavy lifting, no more that 8 lbs. For 2-3 weeks;   · Limit use of stairs to 2 times daily for the first week home. · No driving for one week. · Avoid intercourse 4-6 weeks, no douching or tampon use. · Check with obstetrician before starting or resuming an exercise program.      Discharge Instructions/Special Treatment/Home Care Needs:     · Continue prenatal vitamins. · Continue to use squirt bottle with warm water on your episiotomy after each bathroom use until bleeding stops. · If steri-strips applied to your incision, remove in 7-10 days. Call your doctor for the following:     · Fever over 101 degrees by mouth. · Vaginal bleeding heavier than a normal menstrual period or clots larger than a golf ball. · Red streaks or increased swelling of legs, painful red streaks on your breast.  · Painful urination, constipation and increased pain or swelling or discharge with your incision. · If you feel extremely anxious or overwhelmed. · If you have thoughts of harming yourself and/or your baby. Pain Management:     · Take Acetaminophen (Tylenol) or Ibuprofen (Advil, Motrin), as directed for pain. · Use a warm Sitz bath 3 times daily to relieve episiotomy or hemorrhoidal discomfort. · For hemorrhoidal discomfort, use Tucks and Anusol cream as needed and directed. · Heating pad to  incision as needed.      Follow-Up Care:     Appointment with MD:   Follow-up Appointments   Procedures    FOLLOW UP VISIT Appointment in: One Week For blood pressure check with Dr Olaf Brennan and 6 weeks for postpartum check     For blood pressure check with Dr Olaf Brennan and 6 weeks for postpartum check     Standing Status:   Standing     Number of Occurrences:   1     Order Specific Question:   Appointment in     Answer: One Week     Telephone number: [phone]    Signed By: Tanika Orona MD                                                                                                   Date: 2017 Time: 10:26 AM    MyChart Activation    Thank you for requesting access to [facility]. Please follow the instructions below to securely access and download your online medical record. [facility] allows you to send messages to your doctor, view your test results, renew your prescriptions, schedule appointments, and more. How Do I Sign Up? 1. In your internet browser, go to [email or web address]. 2. Click on the First Time User? Click Here link in the Sign In box. You will see the New Member Sign Up page. 3. Enter your [facility] Access Code exactly as it appears below. You will not need to use this code after youve completed the sign-up process. If you do not sign up before the expiration date, you must request a new code. [facility] Access Code: [email or web address]  Expires: 3/7/2018 11:07 AM (This is the date your [facility] access code will )    4. Enter the last four digits of your Social Security Number (xxxx) and Date of Birth (mm/dd/yyyy) as indicated and click Submit. You will be taken to the next sign-up page. 5. Create a [facility]zu ID. This will be your [facility] login ID and cannot be changed, so think of one that is secure and easy to remember. 6. Create a [facility]zu password. You can change your password at any time. 7. Enter your Password Reset Question and Answer. This can be used at a later time if you forget your password. 8. Enter your e-mail address.  You will receive e-mail notification when new information is available in [employer] 9. Click Sign Up. You can now view and download portions of your medical record. 10. Click the Download Summary menu link to download a portable copy of your medical information. Additional Information    If you have questions, please visit the Frequently Asked Questions section of the [employer] website at [email or web address] Remember, [employer] is NOT to be used for urgent needs. For medical emergencies, dial 911.

## 2017-12-07 NOTE — DISCHARGE SUMMARY
Obstetrical Discharge Summary     Name: Addi Colon MRN: [id number]  SSN: [id number]    YOB: 1985  Age: 36 y.o. Sex: female      Admit Date: 2017    Discharge Date: 2017     Admitting Physician: Alec Portillo MD     Attending Physician:  Rachel Girard MD     Admission Diagnoses: Gest Hypertension  Gestational hypertension  Gestational hypertension    Discharge Diagnoses:   Information for the patient's : Makayla Hernandez [[id number]]   Delivery of a 3.085 kg female infant via Vaginal, Spontaneous Delivery on 2017 at 7:26 PM  by . Apgars were 9 and 9. Additional Diagnoses:   Hospital Problems  Date Reviewed: 2016    None         Lab Results   Component Value Date/Time    Rubella, External non immune 2017    GrBStrep, External negative 2017       Hospital Course: Normal hospital course following the delivery. Disposition at Discharge: Home or self care    Discharged Condition: Stable    Patient Instructions:   Current Discharge Medication List      START taking these medications    Details   ibuprofen (MOTRIN) 600 mg tablet Take 1 Tab by mouth every six (6) hours as needed for Pain for up to 360 days. Qty: 30 Tab, Refills: 0         CONTINUE these medications which have NOT CHANGED    Details   prenatal multivit-ca-min-fe-fa tab Take  by mouth. Reference my discharge instructions. Follow-up Appointments   Procedures    FOLLOW UP VISIT Appointment in: One Week For blood pressure check with Dr Brenda Gordon and 6 weeks for postpartum check     For blood pressure check with Dr Brenda Gordon and 6 weeks for postpartum check     Standing Status:   Standing     Number of Occurrences:   1     Order Specific Question:   Appointment in     Answer:    One Week        Signed By:  Morenita Bernstein MD     2017

## 2021-06-08 ENCOUNTER — VIRTUAL VISIT (OUTPATIENT)
Dept: SLEEP MEDICINE | Age: 36
End: 2021-06-08
Payer: COMMERCIAL

## 2021-06-08 VITALS
SYSTOLIC BLOOD PRESSURE: 132 MMHG | DIASTOLIC BLOOD PRESSURE: 80 MMHG | WEIGHT: 154 LBS | BODY MASS INDEX: 26.29 KG/M2 | HEIGHT: 64 IN

## 2021-06-08 DIAGNOSIS — F32.A ANXIETY AND DEPRESSION: ICD-10-CM

## 2021-06-08 DIAGNOSIS — G47.33 OSA (OBSTRUCTIVE SLEEP APNEA): Primary | ICD-10-CM

## 2021-06-08 DIAGNOSIS — F41.9 ANXIETY AND DEPRESSION: ICD-10-CM

## 2021-06-08 PROCEDURE — 99203 OFFICE O/P NEW LOW 30 MIN: CPT | Performed by: INTERNAL MEDICINE

## 2021-06-08 RX ORDER — FLUOXETINE HYDROCHLORIDE 20 MG/1
20 CAPSULE ORAL DAILY
COMMUNITY

## 2021-06-08 NOTE — PATIENT INSTRUCTIONS
[address] [facility] [place]. [address] [facility], [address] Tel.  [phone] Fax. [address] [facility] [address], [address] [facility] Tel.  [phone] Fax. [phone] [address] Tel.  [phone] Fax. [phone] Sleep Apnea: After Your Visit Your Care Instructions Sleep apnea occurs when you frequently stop breathing for 10 seconds or longer during sleep. It can be mild to severe, based on the number of times per hour that you stop breathing or have slowed breathing. Blocked or narrowed airways in your nose, mouth, or throat can cause sleep apnea. Your airway can become blocked when your throat muscles and tongue relax during sleep. Sleep apnea is common, occurring in 1 out of 20 individuals. Individuals having any of the following characteristics should be evaluated and treated right away due to high risk and detrimental consequences from untreated sleep apnea: 
1. Obesity 2. Congestive Heart failure 3. Atrial Fibrillation 4. Uncontrolled Hypertension 5. Type II Diabetes 6. Night-time Arrhythmias 7. Stroke 8. Pulmonary Hypertension 9. High-risk Driving Populations (pilots, truck drivers, etc.) 10. Patients Considering Weight-loss Surgery How do you know you have sleep apnea? You probably have sleep apnea if you answer 'yes' to 3 or more of the following questions: S - Have you been told that you Snore? T - Are you often Tired during the day? O - Has anyone Observed you stop breathing while sleeping? P- Do you have (or are being treated for) high blood Pressure? B - Are you obese (Body Mass Index > 35)? A - Is your Age 48years old or older? N - Is your Neck size greater than 16 inches? G - Are you male Gender? A sleep physician can prescribe a breathing device that prevents tissues in the throat from blocking your airway.  Or your doctor may recommend using a dental device (oral breathing device) to help keep your airway open. In some cases, surgery may be needed to remove enlarged tissues in the throat. Follow-up care is a key part of your treatment and safety. Be sure to make and go to all appointments, and call your doctor if you are having problems. It's also a good idea to know your test results and keep a list of the medicines you take. How can you care for yourself at home? · Lose weight, if needed. It may reduce the number of times you stop breathing or have slowed breathing. · Go to bed at the same time every night. · Sleep on your side. It may stop mild apnea. If you tend to roll onto your back, sew a pocket in the back of your pajama top. Put a tennis ball into the pocket, and stitch the pocket shut. This will help keep you from sleeping on your back. · Avoid alcohol and medicines such as sleeping pills and sedatives before bed. · Do not smoke. Smoking can make sleep apnea worse. If you need help quitting, talk to your doctor about stop-smoking programs and medicines. These can increase your chances of quitting for good. · Prop up the head of your bed 4 to 6 inches by putting bricks under the legs of the bed. · Treat breathing problems, such as a stuffy nose, caused by a cold or allergies. · Use a continuous positive airway pressure (CPAP) breathing machine if lifestyle changes do not help your apnea and your doctor recommends it. The machine keeps your airway from closing when you sleep. · If CPAP does not help you, ask your doctor whether you should try other breathing machines. A bilevel positive airway pressure machine has two types of air pressureâone for breathing in and one for breathing out. Another device raises or lowers air pressure as needed while you breathe. · If your nose feels dry or bleeds when using one of these machines, talk with your doctor about increasing moisture in the air. A humidifier may help.  
· If your nose is runny or stuffy from using a breathing machine, talk with your doctor about using decongestants or a corticosteroid nasal spray. When should you call for help? Watch closely for changes in your health, and be sure to contact your doctor if: 
· You still have sleep apnea even though you have made lifestyle changes. · You are thinking of trying a device such as CPAP. · You are having problems using a CPAP or similar machine. Where can you learn more? Go to [employer]. Enter B258 in the search box to learn more about \"Sleep Apnea: After Your Visit. \"  
© 7017-7112 Healthwise, Incorporated. Care instructions adapted under license by [facility] [employer] (which disclaims liability or warranty for this information). This care instruction is for use with your licensed healthcare professional. If you have questions about a medical condition or this instruction, always ask your healthcare professional. Cain Junior any warranty or liability for your use of this information. PROPER SLEEP HYGIENE What to avoid · Do not have drinks with caffeine, such as coffee or black tea, for 8 hours before bed. · Do not smoke or use other types of tobacco near bedtime. Nicotine is a stimulant and can keep you awake. · Avoid drinking alcohol late in the evening, because it can cause you to wake in the middle of the night. · Do not eat a big meal close to bedtime. If you are hungry, eat a light snack. · Do not drink a lot of water close to bedtime, because the need to urinate may wake you up during the night. · Do not read or watch TV in bed. Use the bed only for sleeping and sexual activity. What to try · Go to bed at the same time every night, and wake up at the same time every morning. Do not take naps during the day. · Keep your bedroom quiet, dark, and cool. · Get regular exercise, but not within 3 to 4 hours of your bedtime. Jayjay Renner · Sleep on a comfortable pillow and mattress.  
· If watching the clock makes you anxious, turn it facing away from you so you cannot see the time. · If you worry when you lie down, start a worry book. Well before bedtime, write down your worries, and then set the book and your concerns aside. · Try meditation or other relaxation techniques before you go to bed. · If you cannot fall asleep, get up and go to another room until you feel sleepy. Do something relaxing. Repeat your bedtime routine before you go to bed again. · Make your house quiet and calm about an hour before bedtime. Turn down the lights, turn off the TV, log off the computer, and turn down the volume on music. This can help you relax after a busy day. Drowsy Driving The [employer] cites drowsiness as a causing factor in more than 369,282 police reported crashes annually, resulting in 76,000 injuries and 1,500 deaths. Other surveys suggest 55% of people polled have driven while drowsy in the past year, 23% had fallen asleep but not crashed, 3% crashed, and 2% had and accident due to drowsy driving. Who is at risk? Young Drivers: One study of drowsy driving accidents states that 55% of the drivers were under 25 years. Of those, 75% were male. Shift Workers and Travelers: People who work overnight or travel across time zones frequently are at higher risk of experiencing Circadian Rhythm Disorders. They are trying to work and function when their body is programed to sleep. Sleep Deprived: Lack of sleep has a serious impact on your ability to pay attention or focus on a task. Consistently getting less than the average of 8 hours your body needs creates partial or cumulative sleep deprivation. Untreated Sleep Disorders: Sleep Apnea, Narcolepsy, R.L.S., and other sleep disorders (untreated) prevent a person from getting enough restful sleep. This leads to excessive daytime sleepiness and increases the risk for drowsy driving accidents by up to 7 times.  
Medications / Alcohol: Even over the counter medications can cause drowsiness. Medications that impair a drivers attention should have a warning label. Alcohol naturally makes you sleepy and on its own can cause accidents. Combined with excessive drowsiness its effects are amplified. Signs of Drowsy Driving: * You don't remember driving the last few miles * You may drift out of your lise * You are unable to focus and your thoughts wander * You may yawn more often than normal 
 * You have difficulty keeping your eyes open / nodding off * Missing traffic signs, speeding, or tailgating Prevention-  
Good sleep hygiene, lifestyle and behavioral choices have the most impact on drowsy driving. There is no substitute for sleep and the average person requires 8 hours nightly. If you find yourself driving drowsy, stop and sleep. Consider the sleep hygiene tips provided during your visit as well. Medication Refill Policy: Refills for all medications require 1 week advance notice. Please have your pharmacy fax a refill request. We are unable to fax, or call in \"controled substance\" medications and you will need to pick these prescriptions up from our office. [employer] Activation Thank you for requesting access to [employer]. Please follow the instructions below to securely access and download your online medical record. [employer] allows you to send messages to your doctor, view your test results, renew your prescriptions, schedule appointments, and more. How Do I Sign Up? 1. In your internet browser, go to [email or web address]. 2. Click on the First Time User? Click Here link in the Sign In box. You will see the New Member Sign Up page. 3. Enter your [employer] Access Code exactly as it appears below. You will not need to use this code after youve completed the sign-up process. If you do not sign up before the expiration date, you must request a new code. [employer] Access Code: Activation code not generated Current [employer] Status: Active (This is the date your [employer] access code will ) 4. Enter the last four digits of your Social Security Number (xxxx) and Date of Birth (mm/dd/yyyy) as indicated and click Submit. You will be taken to the next sign-up page. 5. Create a Channel[employer] ID. This will be your Channel[employer] login ID and cannot be changed, so think of one that is secure and easy to remember. 6. Create a Channel[employer]ight password. You can change your password at any time. 7. Enter your Password Reset Question and Answer. This can be used at a later time if you forget your password. 8. Enter your e-mail address. You will receive e-mail notification when new information is available in [address] 9. Click Sign Up. You can now view and download portions of your medical record. 10. Click the Download Summary menu link to download a portable copy of your medical information. Additional Information If you have questions, please call [phone]. Remember, [employer] is NOT to be used for urgent needs. For medical emergencies, dial 911.

## 2021-06-08 NOTE — PROGRESS NOTES
[address] [place]. [place], [address]  Tel.  [phone]  Fax. [address]  [address] [facility] [address], [address] [facility]  Tel.  [phone]  Fax. [phone] [address] [facility] [address]  Tel.  [phone]  Fax. [phone]       Pauline Lizarraga is a 37y.o. year old female referred by Dr. Hans Anderson for evaluation of a sleep disorder. ASSESSMENT/PLAN:      ICD-10-CM ICD-9-CM    1. MALORIE (obstructive sleep apnea)  G47.33 327.23 SLEEP STUDY UNATTENDED, 4 CHANNEL   2. Anxiety and depression  F41.9 300.00     F32.9 311    3. BMI 26.0-26.9,adult  Z68.26 V85.22        Patient has a history and examination consistent with the diagnosis of sleep apnea. Follow-up and Dispositions    · Return for telephone follow-up after testing is completed. * The patient currently has a Minimal risk for having sleep apnea. STOP-BANG score 2.    * Sleep testing was ordered for initial evaluation. Orders Placed This Encounter    SLEEP STUDY UNATTENDED, 4 CHANNEL     Scheduling Instructions:      Perform testing on 2 consecutive     Order Specific Question:   Reason for Exam     Answer:   MALORIE       * She was provided information on sleep apnea including corresponding risk factors and the importance of proper treatment. * Treatment options were reviewed in detail. she would like to proceed with PAP therapy. Patient will be seen in follow-up in 6-8 weeks after PAP setup to gauge treatment response and adherence to therapy. * The patient was counseled regarding proper sleep hygiene, with emphasis on ensuring sufficient total sleep time; safe driving and the benefits of exercise and weight loss. * All of her questions were addressed. 2. Recommended a dedicated weight loss program through appropriate diet and exercise regimen as significant weight reduction has been shown to reduce severity of obstructive sleep apnea.      SUBJECTIVE/OBJECTIVE:    Assunta Schaumann Christin is an 37 y.o. female referred for evaluation for a sleep disorder. She complains of snoring associated with snorting, choking, periods of not breathing, body jerks and tiredness on waking and during the day. Symptoms began 3 years ago, gradually worsening since that time. She usually can fall asleep in <5 minutes. Family or house members note snoring, periods of not breathing. She denies of symptoms indicative of cataplexy, sleep paralysis or sleep related hallucinations. She reports of a history of having jerking movements of the whole body occurring during sleep. Ant Bhat does not wake up frequently at night. She is not bothered by waking up too early and left unable to get back to sleep. She actually sleeps about 8 hours at night and wakes up about 3 times during the night. She does not work shifts: Jaki Perrin indicates she does not get too little sleep at night. Her bedtime is 2200. She awakens at 0600. She does take naps. She takes 2 naps a week lasting 1 hours. She has the following observed behaviors: Loud snoring, Light snoring, Twitching of legs or feet, Grinding teeth, Sleep talking, Kicking with legs, Biting tongue; Other (use comments). Other remarks: waking with a gasp or snort, and vivid dreams     The patient has not undergone diagnostic testing for the current problems. Review of Systems:  Constitutional:  No significant weight loss or weight gain  Eyes:  No blurred vision  CVS:  No significant chest pain  Pulm:  No significant shortness of breath  GI:  No significant nausea or vomiting  :  No significant nocturia  Musculoskeletal:  No significant joint pain at night  Skin:  No significant rashes  Neuro:  No significant dizziness   Psych:  No active mood issues    Sleep Review of Systems: notable for Negative difficulty falling asleep; Positive awakenings at night; Positive perceived regular dreaming; Negative nightmares;  Negative  early morning headaches; Positive memory problems; Negative  concentration issues; Negative caffeine;  Negative alcohol;   Negative history of any automobile or occupational accidents due to daytime drowsiness. [place] Sleepiness Score: 9   and Modified F.O.S.Q. Score Total / 2: 14    Patient-Reported Vitals 6/8/2021   Patient-Reported Weight 154lb   Patient-Reported Systolic  909   Patient-Reported Diastolic 80       Physical Exam completed by visual and auditory observation of patient with verbal input from patient. General:   Alert, oriented, not in acute distress   Eyes:  Anicteric Sclerae; no obvious strabismus   Nose:  No obvious nasal septum deviation    Neck:   Midline trachea, no visible mass   Chest/Lungs:  Respiratory effort normal, no visualized signs of difficulty breathing or respiratory distress   CVS:  No JVD   Extremities:  No obvious rashes noted on face, neck, or hands   Neuro:  No facial asymmetry, no focal deficits; no obvious tremor    Psych:  Normal affect,  normal countenance     Radha Waller is being evaluated by a Virtual Visit (video visit) encounter to address concerns as mentioned above. A caregiver was present when appropriate. Due to this being a TeleHealth encounter ([facility]), evaluation of the following organ systems was limited: Vitals/Constitutional/EENT/Resp/CV/GI//MS/Neuro/Skin/Heme-Lymph-Imm. Pursuant to the emergency declaration under the [address] [place] [address], [address] [place] [address] authority and the [employer] and [employer] General Act, this Virtual Visit was conducted with patient's (and/or legal guardian's) consent, to reduce the patient's risk of exposure to COVID-19 and provide necessary medical care. Patient identification was verified at the start of the visit: YES using name and date of birth. Patient's phone number [phone] (cell)  was confirmed for accuracy.   She gives permission for messages regarding results and appointments to be left at that number. Services were provided through a video synchronous discussion virtually to substitute for in-person clinic visit. I was in the office while conducting this encounter, patient located at their home or alternate location of their choice. An electronic signature was used to authenticate this note. Fito Rivera MD, FAASM  Diplomate American Board of Sleep Medicine  Diplomate in Sleep Medicine - ABP    Electronically signed.  06/08/21

## 2021-06-28 ENCOUNTER — TELEPHONE (OUTPATIENT)
Dept: SLEEP MEDICINE | Age: 36
End: 2021-06-28

## 2023-05-25 RX ORDER — FLUOXETINE HYDROCHLORIDE 20 MG/1
20 CAPSULE ORAL DAILY
COMMUNITY

## 2023-05-25 RX ORDER — PREDNISONE 10 MG/1
TABLET ORAL
COMMUNITY
Start: 2020-02-20